# Patient Record
Sex: MALE | URBAN - METROPOLITAN AREA
[De-identification: names, ages, dates, MRNs, and addresses within clinical notes are randomized per-mention and may not be internally consistent; named-entity substitution may affect disease eponyms.]

---

## 2022-08-04 ENCOUNTER — OFFICE VISIT (OUTPATIENT)
Dept: ORTHOPEDIC SURGERY | Age: 47
End: 2022-08-04
Payer: OTHER GOVERNMENT

## 2022-08-04 VITALS
WEIGHT: 205 LBS | HEIGHT: 70 IN | BODY MASS INDEX: 29.35 KG/M2 | RESPIRATION RATE: 15 BRPM | HEART RATE: 87 BPM | OXYGEN SATURATION: 97 %

## 2022-08-04 DIAGNOSIS — S82.61XA CLOSED DISPLACED FRACTURE OF LATERAL MALLEOLUS OF RIGHT FIBULA, INITIAL ENCOUNTER: Primary | ICD-10-CM

## 2022-08-04 PROCEDURE — 27781 TREATMENT OF FIBULA FRACTURE: CPT | Performed by: FAMILY MEDICINE

## 2022-08-04 PROCEDURE — 99204 OFFICE O/P NEW MOD 45 MIN: CPT | Performed by: FAMILY MEDICINE

## 2022-08-04 RX ORDER — IBUPROFEN 800 MG/1
800 TABLET ORAL
Qty: 90 TABLET | Refills: 2 | Status: SHIPPED | OUTPATIENT
Start: 2022-08-04 | End: 2022-11-02

## 2022-08-04 RX ORDER — CETIRIZINE HCL 10 MG
TABLET ORAL
COMMUNITY

## 2022-08-04 RX ORDER — MINERAL OIL
ENEMA (ML) RECTAL
COMMUNITY

## 2022-08-04 RX ORDER — FLUTICASONE PROPIONATE 50 MCG
2 SPRAY, SUSPENSION (ML) NASAL DAILY
COMMUNITY

## 2022-08-04 RX ORDER — IBUPROFEN 800 MG/1
TABLET ORAL
COMMUNITY

## 2022-08-04 RX ORDER — BISMUTH SUBSALICYLATE 262 MG
1 TABLET,CHEWABLE ORAL DAILY
COMMUNITY

## 2022-08-04 NOTE — LETTER
8/4/2022    Patient: Royden Boeck   YOB: 1975   Date of Visit: 8/4/2022     Renita Mooney, [facility] [address] [place] [id number]  Via Fax: [phone]    Dear BOBBI Harris,      Thank you for referring Mr. Royden Boeck to [facility] [address]. for evaluation. My notes for this consultation are attached. If you have questions, please do not hesitate to call me. I look forward to following your patient along with you.       Sincerely,    Omar Maldonado, DO

## 2022-08-04 NOTE — PROGRESS NOTES
HISTORY OF PRESENT ILLNESS    Gladys Bell 1975 is a 50y.o. year old male comes in today as new patient for: right ankle Fx    Patients symptoms have been present since 7/21/2022 when rolled ankle in a mole run in his yard. Pain level 8/10 lateral. It has improved with crutches/walker boot from [facility] on 7/25/2022  Patient has tried:  ibuprofen 800mg with benefit. It is described as pain and swell lateral w/ bruising. IMAGING: XR right ankle 7/25/2022 images reviewed [facility] disc and I find:  Minimally posterior/inferiorly displaced lateral malleolus fracture. Past Surgical History:   Procedure Laterality Date    HX WISDOM TEETH EXTRACTION       Social History     Socioeconomic History    Marital status: UNKNOWN   Tobacco Use    Smoking status: Never    Smokeless tobacco: Never   Vaping Use    Vaping Use: Never used   Substance and Sexual Activity    Alcohol use: Yes     Comment: socially    Drug use: Never      Current Outpatient Medications   Medication Sig Dispense Refill    ibuprofen (MOTRIN) 800 mg tablet Take  by mouth. fexofenadine (ALLEGRA) 180 mg tablet Take  by mouth.      multivitamin (ONE A DAY) tablet Take 1 Tablet by mouth in the morning. fluticasone propionate (Flonase Allergy Relief) 50 mcg/actuation nasal spray 2 Sprays by Both Nostrils route daily. cetirizine (ZyrTEC) 10 mg tablet Take  by mouth. Past Medical History:   Diagnosis Date    Idiopathic chronic gout of multiple sites without tophus      History reviewed. No pertinent family history. ROS:  + swell, bruise,  no numb      Objective:  Pulse 87   Resp 15   Ht 5' 10\" (1.778 m)   Wt 205 lb (93 kg)   SpO2 97%   BMI 29.41 kg/m²   NEURO:  Sensation intact light touch B/L lower extremities. Reflexes +2/4 patellar and Achilles bilaterally. MS:  Strength normal throughout upper and lower extremities bilateral .   right ankle/foot:  Positive tenderness at lateral malleolus.   Anterior drawer test negative. Talar tilt negative. Kleiger test negative. Syndesmosis squeeze negative. negative fibular head tenderness. Fibular head motion normal. Gait antalgic boot/crutches. ROM abnormal.      Assessment/Plan:     ICD-10-CM ICD-9-CM    1. Closed displaced fracture of lateral malleolus of right fibula, initial encounter  S82. 61XA 824.2 CT CLOSED RX PROX/SHAFT FIB FX,MANIP      ibuprofen (MOTRIN) 800 mg tablet          Patient (or guardian if minor) verbalizes understanding of evaluation and plan. Will continue boot and crutches w/ ibuprofen Rx as above and plan follow-up 4 weeks.

## 2022-09-01 ENCOUNTER — OFFICE VISIT (OUTPATIENT)
Dept: ORTHOPEDIC SURGERY | Age: 47
End: 2022-09-01
Payer: OTHER GOVERNMENT

## 2022-09-01 ENCOUNTER — HOSPITAL ENCOUNTER (OUTPATIENT)
Dept: OCCUPATIONAL MEDICINE | Age: 47
Discharge: HOME | End: 2022-09-01
Attending: FAMILY MEDICINE

## 2022-09-01 VITALS
WEIGHT: 213 LBS | HEART RATE: 94 BPM | OXYGEN SATURATION: 97 % | HEIGHT: 70 IN | RESPIRATION RATE: 14 BRPM | BODY MASS INDEX: 30.49 KG/M2

## 2022-09-01 DIAGNOSIS — S82.61XD CLOSED FRACTURE OF DISTAL LATERAL MALLEOLUS OF ANKLE WITH ROUTINE HEALING, RIGHT: ICD-10-CM

## 2022-09-01 DIAGNOSIS — S82.61XD CLOSED FRACTURE OF DISTAL LATERAL MALLEOLUS OF ANKLE WITH ROUTINE HEALING, RIGHT: Primary | ICD-10-CM

## 2022-09-01 PROCEDURE — 99024 POSTOP FOLLOW-UP VISIT: CPT | Performed by: FAMILY MEDICINE

## 2022-09-01 NOTE — PROGRESS NOTES
HISTORY OF PRESENT ILLNESS    Matthew Cosby 1975 is a 50y.o. year old male comes in today to be evaluated and treated for: right ankle Fx    Since last appt has noticed pain much improved. Pain level 2/10. Using ibuprofen but not needing in 2 weeks with benefit. Was able to wear boots and return to work 4 days ago. IMAGING: XR right ankle 7/25/2022 images reviewed [facility] disc and I find:  Minimally posterior/inferiorly displaced lateral malleolus fracture. Past Surgical History:   Procedure Laterality Date    HX WISDOM TEETH EXTRACTION       Social History     Socioeconomic History    Marital status: UNKNOWN   Tobacco Use    Smoking status: Never    Smokeless tobacco: Never   Vaping Use    Vaping Use: Never used   Substance and Sexual Activity    Alcohol use: Yes     Comment: socially    Drug use: Never     Current Outpatient Medications   Medication Sig Dispense Refill    ibuprofen (MOTRIN) 800 mg tablet Take  by mouth. fexofenadine (ALLEGRA) 180 mg tablet Take  by mouth.      multivitamin (ONE A DAY) tablet Take 1 Tablet by mouth in the morning. fluticasone propionate (FLONASE) 50 mcg/actuation nasal spray 2 Sprays by Both Nostrils route daily. cetirizine (ZYRTEC) 10 mg tablet Take  by mouth. ibuprofen (MOTRIN) 800 mg tablet Take 1 Tablet by mouth every eight (8) hours as needed for Pain for up to 90 days. 90 Tablet 2     Past Medical History:   Diagnosis Date    Idiopathic chronic gout of multiple sites without tophus      History reviewed. No pertinent family history. ROS:  Some swell, no bruise    Objective:  Pulse 94   Resp 14   Ht 5' 10\" (1.778 m)   Wt 213 lb (96.6 kg)   SpO2 97%   BMI 30.56 kg/m²   NEURO:  Sensation intact light touch B/L lower extremities. Reflexes +2/4 patellar and Achilles bilaterally. MS:  Strength normal throughout upper and lower extremities bilateral .   right ankle/foot:  Mild tenderness at lateral malleolus.   Anterior drawer test negative. Talar tilt negative. Kleiger test negative. Syndesmosis squeeze negative. negative fibular head tenderness. Fibular head motion normal. Gait antalgic boot/crutches. ROM abnormal.    Assessment/Plan:     ICD-10-CM ICD-9-CM    1. Closed fracture of distal lateral malleolus of ankle with routine healing, right  S82.61XD V54.19 REFERRAL TO PHYSICAL THERAPY      XR ANKLE RT MIN 3 V          Patient (or guardian if minor) verbalizes understanding of evaluation and plan. Will start PT as above and plan follow-up 4 weeks. Continue brace, ice, ibuprofen PRN and limit duty [employer].

## 2022-09-01 NOTE — LETTER
9/1/2022    Patient: Jana Peoples   YOB: 1975   Date of Visit: 9/1/2022     Terri Kamara, [facility] [address] [place] [phone]  Via Fax: [phone]    Dear BOBBI Whitaker,      Thank you for referring Mr. Jana Peoples to [facility] for evaluation. My notes for this consultation are attached. If you have questions, please do not hesitate to call me. I look forward to following your patient along with you.       Sincerely,    Gamaliel Cardoso, DO